# Patient Record
Sex: MALE | URBAN - METROPOLITAN AREA
[De-identification: names, ages, dates, MRNs, and addresses within clinical notes are randomized per-mention and may not be internally consistent; named-entity substitution may affect disease eponyms.]

---

## 2024-04-18 ENCOUNTER — APPOINTMENT (RX ONLY)
Dept: URBAN - METROPOLITAN AREA CLINIC 144 | Facility: CLINIC | Age: 24
Setting detail: DERMATOLOGY
End: 2024-04-18

## 2024-04-18 DIAGNOSIS — R68.3 CLUBBING OF FINGERS: ICD-10-CM

## 2024-04-18 DIAGNOSIS — L65.9 NONSCARRING HAIR LOSS, UNSPECIFIED: ICD-10-CM

## 2024-04-18 PROCEDURE — ? COUNSELING

## 2024-04-18 PROCEDURE — ? ADDITIONAL NOTES

## 2024-04-18 PROCEDURE — ? ORDER TESTS

## 2024-04-18 PROCEDURE — 99203 OFFICE O/P NEW LOW 30 MIN: CPT

## 2024-04-18 ASSESSMENT — LOCATION DETAILED DESCRIPTION DERM
LOCATION DETAILED: RIGHT MIDDLE FINGER LUNULA
LOCATION DETAILED: POSTERIOR MID-PARIETAL SCALP
LOCATION DETAILED: LEFT 2ND TOENAIL
LOCATION DETAILED: RIGHT 2ND TOENAIL
LOCATION DETAILED: LEFT MIDDLE FINGERNAIL

## 2024-04-18 ASSESSMENT — LOCATION SIMPLE DESCRIPTION DERM
LOCATION SIMPLE: RIGHT 2ND TOE
LOCATION SIMPLE: RIGHT MIDDLE FINGERNAIL
LOCATION SIMPLE: POSTERIOR SCALP
LOCATION SIMPLE: LEFT MIDDLE FINGER
LOCATION SIMPLE: LEFT 2ND TOE

## 2024-04-18 ASSESSMENT — LOCATION ZONE DERM
LOCATION ZONE: TOENAIL
LOCATION ZONE: SCALP
LOCATION ZONE: FINGERNAIL

## 2024-04-18 NOTE — PROCEDURE: COUNSELING
Detail Level: Zone
Kath Coffman)
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

## 2024-04-18 NOTE — PROCEDURE: ADDITIONAL NOTES
Render Risk Assessment In Note?: no
Detail Level: Simple
Additional Notes: Patient states he had chest X-ray already.
Additional Notes: During today's appointment, the patient's mother provided valuable insights. She mentioned that the patient began exhibiting signs of clubbing in high school. While acknowledging occasional gastrointestinal discomfort, primarily due to a hearty appetite leading to frequent bathroom visits, the patient denies any cardiac or pulmonary issues. Notably, he actively participated in football throughout high school without experiencing shortness of breath or cardiac concerns. The patient's mother disclosed that the father has cardiac disease and also exhibits nail clubbing, attributed to his heart condition. The patient reports only taking vitamins, including iron due to previous low levels, and had briefly taken vitamin D but stopped recently. Presently, he denies any respiratory or cardiac symptoms. Recent lab results from July 25, 2023, including CBC, CMP, UAS, Lipid panel, TSH, Free t4, and hemoglobin A1C, indicate within normal limits. We discussed various causes of clubbing, including cardiac and gastrointestinal origins, as well as the potential for hereditary/congenital forms with autosomal dominant inheritance unaccompanied by systemic disease. It was advised to initiate further evaluation with the primary care physician and consider referral to Cardiology/pulmonology for comprehensive screening, given that 80% of cases are linked to cardiothoracic disorders.

## 2024-04-18 NOTE — PROCEDURE: ORDER TESTS
Performing Laboratory: [id number]
Bill For Surgical Tray: no
Billing Type: Third-Party Bill
Expected Date Of Service: 04/18/2024